# Patient Record
Sex: FEMALE | Race: WHITE | URBAN - METROPOLITAN AREA
[De-identification: names, ages, dates, MRNs, and addresses within clinical notes are randomized per-mention and may not be internally consistent; named-entity substitution may affect disease eponyms.]

---

## 2023-07-24 ENCOUNTER — NON-PROVIDER VISIT (OUTPATIENT)
Dept: OCCUPATIONAL MEDICINE | Facility: CLINIC | Age: 28
End: 2023-07-24

## 2023-07-24 DIAGNOSIS — Z02.89 ENCOUNTER FOR OCCUPATIONAL HEALTH EXAMINATION INVOLVING RESPIRATOR: ICD-10-CM

## 2023-07-24 PROCEDURE — 94375 RESPIRATORY FLOW VOLUME LOOP: CPT | Performed by: NURSE PRACTITIONER

## 2023-07-24 PROCEDURE — 94010 BREATHING CAPACITY TEST: CPT | Performed by: NURSE PRACTITIONER

## 2025-01-20 ENCOUNTER — EH NON-PROVIDER (OUTPATIENT)
Dept: OCCUPATIONAL MEDICINE | Facility: CLINIC | Age: 30
End: 2025-01-20

## 2025-01-20 DIAGNOSIS — Z02.1 PRE-EMPLOYMENT DRUG SCREENING: ICD-10-CM

## 2025-01-20 DIAGNOSIS — Z02.89 ENCOUNTER FOR OCCUPATIONAL HEALTH ASSESSMENT: ICD-10-CM

## 2025-01-20 LAB
AMP AMPHETAMINE: NORMAL
BAR BARBITURATES: NORMAL
BZO BENZODIAZEPINES: NORMAL
COC COCAINE: NORMAL
INT CON NEG: NORMAL
INT CON POS: NORMAL
MDMA ECSTASY: NORMAL
MET METHAMPHETAMINES: NORMAL
MTD METHADONE: NORMAL
OPI OPIATES: NORMAL
OXY OXYCODONE: NORMAL
PCP PHENCYCLIDINE: NORMAL
POC URINE DRUG SCREEN OCDRS: NEGATIVE
THC: NORMAL

## 2025-01-20 PROCEDURE — 94375 RESPIRATORY FLOW VOLUME LOOP: CPT | Performed by: PREVENTIVE MEDICINE

## 2025-01-20 PROCEDURE — 80305 DRUG TEST PRSMV DIR OPT OBS: CPT | Performed by: PREVENTIVE MEDICINE

## 2025-01-20 NOTE — PROGRESS NOTES
This patient was seen for a pre-employment MA visit.     Company- [employer]  Position - Physician Assistant    Pre-employment drug screen completed - complete  Color blindness test - 14/14  Quantiferon Gold TB test -Docs 2/2024  Provider Physical - NA  Mask Fit- N95/CAPR/PAPR -complete  Spirometry date -NA     Hand Hygiene form - complete    Vaccines/Titers     Tdap - Docs  COVID -19-Docs  MMR - Docs   Varicella - Docs  Hepatitis B - Docs  Hepatitis A - NA   Flu Shot - [facility] verified 11/2024 - will email docs   Sticker provided - yes  Specialty or other testing needed at this time -NA    The patient has been informed about and verbalizes understanding of the applicable pre-employment requirements set by the employer. yes  The patient has been informed about and verbalizes understanding that they may be required to return to Occupational Health for additional vaccinations or testing. yes

## 2025-07-11 DIAGNOSIS — Z00.6 CLINICAL TRIAL PARTICIPANT: ICD-10-CM

## 2025-07-14 ENCOUNTER — HOSPITAL ENCOUNTER (OUTPATIENT)
Dept: LAB | Facility: MEDICAL CENTER | Age: 30
End: 2025-07-14
Attending: OBSTETRICS & GYNECOLOGY
Payer: COMMERCIAL

## 2025-07-14 ENCOUNTER — HOSPITAL ENCOUNTER (OUTPATIENT)
Dept: LAB | Facility: MEDICAL CENTER | Age: 30
End: 2025-07-14
Attending: FAMILY MEDICINE

## 2025-07-14 DIAGNOSIS — Z00.6 CLINICAL TRIAL PARTICIPANT: ICD-10-CM

## 2025-07-14 LAB
25(OH)D3 SERPL-MCNC: 28 NG/ML (ref 30–100)
ALBUMIN SERPL BCP-MCNC: 4.1 G/DL (ref 3.2–4.9)
ALBUMIN/GLOB SERPL: 1.5 G/DL
ALP SERPL-CCNC: 68 U/L (ref 30–99)
ALT SERPL-CCNC: 16 U/L (ref 2–50)
ANION GAP SERPL CALC-SCNC: 14 MMOL/L (ref 7–16)
AST SERPL-CCNC: 21 U/L (ref 12–45)
BASOPHILS # BLD AUTO: 0.5 % (ref 0–1.8)
BASOPHILS # BLD: 0.03 K/UL (ref 0–0.12)
BILIRUB SERPL-MCNC: <0.2 MG/DL (ref 0.1–1.5)
BUN SERPL-MCNC: 12 MG/DL (ref 8–22)
CALCIUM ALBUM COR SERPL-MCNC: 8.8 MG/DL (ref 8.5–10.5)
CALCIUM SERPL-MCNC: 8.9 MG/DL (ref 8.5–10.5)
CHLORIDE SERPL-SCNC: 106 MMOL/L (ref 96–112)
CHOLEST SERPL-MCNC: 143 MG/DL (ref 100–199)
CO2 SERPL-SCNC: 19 MMOL/L (ref 20–33)
CORTIS SERPL-MCNC: 12.5 UG/DL (ref 0–23)
CREAT SERPL-MCNC: 0.68 MG/DL (ref 0.5–1.4)
CRP SERPL HS-MCNC: 7.1 MG/L (ref 0–3)
DHEA-S SERPL-MCNC: 333 UG/DL (ref 98.8–340)
EOSINOPHIL # BLD AUTO: 0.22 K/UL (ref 0–0.51)
EOSINOPHIL NFR BLD: 3.4 % (ref 0–6.9)
ERYTHROCYTE [DISTWIDTH] IN BLOOD BY AUTOMATED COUNT: 39.9 FL (ref 35.9–50)
EST. AVERAGE GLUCOSE BLD GHB EST-MCNC: 105 MG/DL
ESTRADIOL SERPL-MCNC: 37.4 PG/ML
FASTING STATUS PATIENT QL REPORTED: NORMAL
FERRITIN SERPL-MCNC: 258 NG/ML (ref 10–291)
FIBRINOGEN PPP-MCNC: 395 MG/DL (ref 215–460)
FOLATE SERPL-MCNC: 5.6 NG/ML
FSH SERPL-ACNC: 5.9 MIU/ML
GFR SERPLBLD CREATININE-BSD FMLA CKD-EPI: 121 ML/MIN/1.73 M 2
GGT SERPL-CCNC: 25 U/L (ref 7–34)
GLOBULIN SER CALC-MCNC: 2.8 G/DL (ref 1.9–3.5)
GLUCOSE SERPL-MCNC: 102 MG/DL (ref 65–99)
HBA1C MFR BLD: 5.3 % (ref 4–5.6)
HCT VFR BLD AUTO: 40.7 % (ref 37–47)
HCYS SERPL-SCNC: 9.28 UMOL/L
HDLC SERPL-MCNC: 53 MG/DL
HGB BLD-MCNC: 13.3 G/DL (ref 12–16)
IMM GRANULOCYTES # BLD AUTO: 0.03 K/UL (ref 0–0.11)
IMM GRANULOCYTES NFR BLD AUTO: 0.5 % (ref 0–0.9)
IRON SATN MFR SERPL: 26 % (ref 15–55)
IRON SERPL-MCNC: 65 UG/DL (ref 40–170)
LDLC SERPL CALC-MCNC: 76 MG/DL
LH SERPL-ACNC: 10 IU/L
LYMPHOCYTES # BLD AUTO: 2.04 K/UL (ref 1–4.8)
LYMPHOCYTES NFR BLD: 31.3 % (ref 22–41)
MAGNESIUM SERPL-MCNC: 2.1 MG/DL (ref 1.5–2.5)
MCH RBC QN AUTO: 27.1 PG (ref 27–33)
MCHC RBC AUTO-ENTMCNC: 32.7 G/DL (ref 32.2–35.5)
MCV RBC AUTO: 82.9 FL (ref 81.4–97.8)
MONOCYTES # BLD AUTO: 0.48 K/UL (ref 0–0.85)
MONOCYTES NFR BLD AUTO: 7.4 % (ref 0–13.4)
NEUTROPHILS # BLD AUTO: 3.72 K/UL (ref 1.82–7.42)
NEUTROPHILS NFR BLD: 56.9 % (ref 44–72)
NRBC # BLD AUTO: 0 K/UL
NRBC BLD-RTO: 0 /100 WBC (ref 0–0.2)
PLATELET # BLD AUTO: 359 K/UL (ref 164–446)
PMV BLD AUTO: 10.3 FL (ref 9–12.9)
POTASSIUM SERPL-SCNC: 3.9 MMOL/L (ref 3.6–5.5)
PROGEST SERPL-MCNC: 0.37 NG/ML
PROLACTIN SERPL-MCNC: 24.6 NG/ML (ref 2.8–26)
PROT SERPL-MCNC: 6.9 G/DL (ref 6–8.2)
RBC # BLD AUTO: 4.91 M/UL (ref 4.2–5.4)
SODIUM SERPL-SCNC: 139 MMOL/L (ref 135–145)
T3 SERPL-MCNC: 123 NG/DL (ref 60–181)
T3FREE SERPL-MCNC: 3.51 PG/ML (ref 2–4.4)
T4 FREE SERPL-MCNC: 1.36 NG/DL (ref 0.93–1.7)
T4 SERPL-MCNC: 7.2 UG/DL (ref 4–12)
THYROPEROXIDASE AB SERPL-ACNC: 12.2 IU/ML (ref 0–9)
TIBC SERPL-MCNC: 253 UG/DL (ref 250–450)
TRIGL SERPL-MCNC: 69 MG/DL (ref 0–149)
TSH SERPL-ACNC: 2.15 UIU/ML (ref 0.38–5.33)
UIBC SERPL-MCNC: 188 UG/DL (ref 110–370)
VIT B12 SERPL-MCNC: 485 PG/ML (ref 211–911)
WBC # BLD AUTO: 6.5 K/UL (ref 4.8–10.8)

## 2025-07-14 PROCEDURE — 84481 FREE ASSAY (FT-3): CPT

## 2025-07-14 PROCEDURE — 83036 HEMOGLOBIN GLYCOSYLATED A1C: CPT

## 2025-07-14 PROCEDURE — 83550 IRON BINDING TEST: CPT

## 2025-07-14 PROCEDURE — 84482 T3 REVERSE: CPT

## 2025-07-14 PROCEDURE — 85384 FIBRINOGEN ACTIVITY: CPT

## 2025-07-14 PROCEDURE — 84270 ASSAY OF SEX HORMONE GLOBUL: CPT

## 2025-07-14 PROCEDURE — 84146 ASSAY OF PROLACTIN: CPT

## 2025-07-14 PROCEDURE — 82642 DIHYDROTESTOSTERONE: CPT

## 2025-07-14 PROCEDURE — 85025 COMPLETE CBC W/AUTO DIFF WBC: CPT

## 2025-07-14 PROCEDURE — 82533 TOTAL CORTISOL: CPT

## 2025-07-14 PROCEDURE — 82607 VITAMIN B-12: CPT

## 2025-07-14 PROCEDURE — 84403 ASSAY OF TOTAL TESTOSTERONE: CPT

## 2025-07-14 PROCEDURE — 82784 ASSAY IGA/IGD/IGG/IGM EACH: CPT

## 2025-07-14 PROCEDURE — 82728 ASSAY OF FERRITIN: CPT

## 2025-07-14 PROCEDURE — 84479 ASSAY OF THYROID (T3 OR T4): CPT

## 2025-07-14 PROCEDURE — 86376 MICROSOMAL ANTIBODY EACH: CPT

## 2025-07-14 PROCEDURE — 84439 ASSAY OF FREE THYROXINE: CPT

## 2025-07-14 PROCEDURE — 82977 ASSAY OF GGT: CPT

## 2025-07-14 PROCEDURE — 83695 ASSAY OF LIPOPROTEIN(A): CPT

## 2025-07-14 PROCEDURE — 82384 ASSAY THREE CATECHOLAMINES: CPT

## 2025-07-14 PROCEDURE — 83540 ASSAY OF IRON: CPT

## 2025-07-14 PROCEDURE — 83002 ASSAY OF GONADOTROPIN (LH): CPT

## 2025-07-14 PROCEDURE — 83735 ASSAY OF MAGNESIUM: CPT

## 2025-07-14 PROCEDURE — 84443 ASSAY THYROID STIM HORMONE: CPT

## 2025-07-14 PROCEDURE — 84480 ASSAY TRIIODOTHYRONINE (T3): CPT

## 2025-07-14 PROCEDURE — 82306 VITAMIN D 25 HYDROXY: CPT

## 2025-07-14 PROCEDURE — 86258 DGP ANTIBODY EACH IG CLASS: CPT

## 2025-07-14 PROCEDURE — 36415 COLL VENOUS BLD VENIPUNCTURE: CPT

## 2025-07-14 PROCEDURE — 84402 ASSAY OF FREE TESTOSTERONE: CPT

## 2025-07-14 PROCEDURE — 86364 TISS TRNSGLTMNASE EA IG CLAS: CPT

## 2025-07-14 PROCEDURE — 82627 DEHYDROEPIANDROSTERONE: CPT

## 2025-07-14 PROCEDURE — 83090 ASSAY OF HOMOCYSTEINE: CPT

## 2025-07-14 PROCEDURE — 83001 ASSAY OF GONADOTROPIN (FSH): CPT

## 2025-07-14 PROCEDURE — 84140 ASSAY OF PREGNENOLONE: CPT

## 2025-07-14 PROCEDURE — 83525 ASSAY OF INSULIN: CPT

## 2025-07-14 PROCEDURE — 82746 ASSAY OF FOLIC ACID SERUM: CPT

## 2025-07-14 PROCEDURE — 82670 ASSAY OF TOTAL ESTRADIOL: CPT

## 2025-07-14 PROCEDURE — 86141 C-REACTIVE PROTEIN HS: CPT

## 2025-07-14 PROCEDURE — 80053 COMPREHEN METABOLIC PANEL: CPT

## 2025-07-14 PROCEDURE — 84144 ASSAY OF PROGESTERONE: CPT

## 2025-07-14 PROCEDURE — 80061 LIPID PANEL: CPT

## 2025-07-14 PROCEDURE — 81291 MTHFR GENE: CPT

## 2025-07-16 LAB
GLIADIN IGA SER IA-ACNC: <0.72 FLU (ref 0–4.99)
LPA SERPL-MCNC: <6 MG/DL
T UPTAKE NL11712: 1 TBI (ref 0.8–1.3)
TTG IGA SER IA-ACNC: <1.02 FLU (ref 0–4.99)

## 2025-07-17 LAB
IGA SERPL-MCNC: 188 MG/DL (ref 68–408)
INSULIN P FAST SERPL-ACNC: 20 UIU/ML (ref 3–25)

## 2025-07-18 LAB
ANDROSTANOLONE SERPL-MCNC: 43.9 PG/ML (ref 24–208)
PREG SERPL-MCNC: 129 NG/DL (ref 15–132)

## 2025-07-19 LAB
DOPAMINE SERPL-MCNC: <130 PMOL/L
EPINEPH PLAS-MCNC: 160 PMOL/L
MTHFR C.1298A>C GENO BLD/T: NEGATIVE
MTHFR C.677C>T GENO BLD/T: NORMAL
MTHFR GENE MUT ANL BLD/T: NORMAL
NOREPINEPH PLAS-MCNC: 1042 PMOL/L (ref 1050–4800)

## 2025-07-20 LAB
SHBG SERPL-SCNC: 19 NMOL/L (ref 25–122)
TESTOST FREE SERPL-MCNC: 3.2 PG/ML (ref 0.8–7.4)
TESTOST SERPL-MCNC: 15 NG/DL (ref 9–55)

## 2025-07-21 LAB — T3REVERSE SERPL-MCNC: 13.4 NG/DL (ref 9–27)

## 2025-07-29 ENCOUNTER — RESULTS FOLLOW-UP (OUTPATIENT)
Dept: MEDICAL GROUP | Facility: PHYSICIAN GROUP | Age: 30
End: 2025-07-29
Payer: COMMERCIAL

## 2025-07-29 DIAGNOSIS — Z15.01 PMS2 GENE MUTATION POSITIVE: Primary | ICD-10-CM

## 2025-07-29 DIAGNOSIS — Z15.09 PMS2 GENE MUTATION POSITIVE: Primary | ICD-10-CM

## 2025-07-29 LAB
APOB+LDLR+PCSK9 GENE MUT ANL BLD/T: NOT DETECTED
BRCA1+BRCA2 DEL+DUP + FULL MUT ANL BLD/T: NOT DETECTED
GENETIC ANALYSIS OVERALL INTERPRETATION: POSITIVE
GENOMIC SOURCE CLASS: ABNORMAL
MLH1+MSH2+MSH6+PMS2 GN DEL+DUP+FUL M: ABNORMAL

## 2025-07-29 NOTE — TELEPHONE ENCOUNTER
Date/time: 7/29/2025 [id number]    Spoke with patient regarding result    Result: PMS2  Dx updated: yes  Genetic Counseling Complete: scheduled  [facility] PCP:  will establish  [facility] specialist: discuss at appointment    HNP appointment scheduled: 8/18/2025    Follow-up recommendations:    Discuss in more detail @ appointment    -Aspirin 81 mg PO daily for colorectal cancer risk reduction   -Annual urinalysis at age 30 if family history of urothelial cancer  -Referral to Gastroenterology  --colonoscopy recommended to start at age 31 or 2-5 years before earliest family colorectal cancer diagnosis.   -Referral to dermatology    FEMALE:  -Referral to gynecology  -Teach importance of reporting abnormal uterine bleeding

## 2025-08-18 ENCOUNTER — OFFICE VISIT (OUTPATIENT)
Dept: MEDICAL GROUP | Facility: PHYSICIAN GROUP | Age: 30
End: 2025-08-18
Payer: COMMERCIAL

## 2025-08-18 VITALS
HEIGHT: 60 IN | BODY MASS INDEX: 38.52 KG/M2 | DIASTOLIC BLOOD PRESSURE: 62 MMHG | OXYGEN SATURATION: 99 % | WEIGHT: 196.2 LBS | TEMPERATURE: 97.8 F | HEART RATE: 90 BPM | SYSTOLIC BLOOD PRESSURE: 112 MMHG

## 2025-08-18 DIAGNOSIS — R76.8 ANTI-TPO ANTIBODIES PRESENT: ICD-10-CM

## 2025-08-18 DIAGNOSIS — Z15.09 LYNCH SYNDROME: ICD-10-CM

## 2025-08-18 DIAGNOSIS — Z15.01 PMS2 GENE MUTATION POSITIVE: Primary | ICD-10-CM

## 2025-08-18 DIAGNOSIS — Z15.09 PMS2 GENE MUTATION POSITIVE: Primary | ICD-10-CM

## 2025-08-18 PROCEDURE — 3078F DIAST BP <80 MM HG: CPT | Performed by: NURSE PRACTITIONER

## 2025-08-18 PROCEDURE — 99204 OFFICE O/P NEW MOD 45 MIN: CPT | Performed by: NURSE PRACTITIONER

## 2025-08-18 PROCEDURE — 3074F SYST BP LT 130 MM HG: CPT | Performed by: NURSE PRACTITIONER

## 2025-08-18 ASSESSMENT — FIBROSIS 4 INDEX: FIB4 SCORE: 0.42

## 2025-08-18 ASSESSMENT — LIFESTYLE VARIABLES
HOW MANY STANDARD DRINKS CONTAINING ALCOHOL DO YOU HAVE ON A TYPICAL DAY: 1 OR 2
HOW OFTEN DO YOU HAVE A DRINK CONTAINING ALCOHOL: 2-4 TIMES A MONTH
AUDIT-C TOTAL SCORE: 5
HOW OFTEN DO YOU HAVE SIX OR MORE DRINKS ON ONE OCCASION: WEEKLY
SKIP TO QUESTIONS 9-10: 0

## 2025-08-18 ASSESSMENT — PATIENT HEALTH QUESTIONNAIRE - PHQ9: CLINICAL INTERPRETATION OF PHQ2 SCORE: 0
